# Patient Record
Sex: MALE | Race: BLACK OR AFRICAN AMERICAN | URBAN - METROPOLITAN AREA
[De-identification: names, ages, dates, MRNs, and addresses within clinical notes are randomized per-mention and may not be internally consistent; named-entity substitution may affect disease eponyms.]

---

## 2023-11-06 ENCOUNTER — OFFICE (OUTPATIENT)
Dept: URBAN - METROPOLITAN AREA CLINIC 19 | Facility: CLINIC | Age: 23
End: 2023-11-06

## 2023-11-06 VITALS
HEIGHT: 71 IN | SYSTOLIC BLOOD PRESSURE: 127 MMHG | HEART RATE: 66 BPM | OXYGEN SATURATION: 99 % | DIASTOLIC BLOOD PRESSURE: 80 MMHG | WEIGHT: 147 LBS

## 2023-11-06 DIAGNOSIS — K21.9 GASTRO-ESOPHAGEAL REFLUX DISEASE WITHOUT ESOPHAGITIS: ICD-10-CM

## 2023-11-06 DIAGNOSIS — R11.0 NAUSEA: ICD-10-CM

## 2023-11-06 DIAGNOSIS — R10.13 EPIGASTRIC PAIN: ICD-10-CM

## 2023-11-06 DIAGNOSIS — Z79.1 LONG TERM (CURRENT) USE OF NON-STEROIDAL ANTI-INFLAMMATORIES: ICD-10-CM

## 2023-11-06 PROCEDURE — 99204 OFFICE O/P NEW MOD 45 MIN: CPT

## 2023-11-06 RX ORDER — LANSOPRAZOLE 30 MG/1
CAPSULE, DELAYED RELEASE PELLETS ORAL
Qty: 60 | Refills: 5 | Status: ACTIVE
Start: 2023-11-06

## 2023-11-06 NOTE — SERVICEHPINOTES
23-year-old male here for evaluation of several year history of nausea and reflux symptoms.  Does not smoke marijuana or vape.   Takes NSAIDs multiple times per week.  Drinks 2 carbonated beverages daily.  Takes apple cider vinegar pills daily.  Drinks 1-2 alcoholic beverages per week.  Eats late at night due to his work schedule.  Reports nocturnal reflux symptoms.  Denies any vomiting. Complains of epigastric pain for the last 3 months.  Reports severity 7/10.  The pain is intermittent and sharp in nature which is non-radiating. No aggravating or relieving factors. Reports regular bowel movements and denies any sense of incomplete evacuation.  Denies any melena or hematochezia or hematemesis.  Denies any abnormal weight loss.  No family history of colon cancer, colon polyps, IBD, esophagitis cancer, stomach cancer or Santiago's esophagus.  No cardiac issues, not taking any blood thinners or diet pills.  He has never had a EGD or colonoscopy.   He does not have a PCP.

## 2023-11-07 LAB
AMYLASE: 93 U/L (ref 31–110)
HEPATIC FUNCTION PANEL (7): ALBUMIN: 4.6 G/DL (ref 4.3–5.2)
HEPATIC FUNCTION PANEL (7): ALKALINE PHOSPHATASE: 77 IU/L (ref 44–121)
HEPATIC FUNCTION PANEL (7): ALT (SGPT): 10 IU/L (ref 0–44)
HEPATIC FUNCTION PANEL (7): AST (SGOT): 15 IU/L (ref 0–40)
HEPATIC FUNCTION PANEL (7): BILIRUBIN, DIRECT: 0.2 MG/DL (ref 0–0.4)
HEPATIC FUNCTION PANEL (7): BILIRUBIN, TOTAL: 0.8 MG/DL (ref 0–1.2)
HEPATIC FUNCTION PANEL (7): PROTEIN, TOTAL: 6.4 G/DL (ref 6–8.5)
LIPASE: 14 U/L (ref 13–78)